# Patient Record
Sex: FEMALE | Race: WHITE | Employment: FULL TIME | URBAN - METROPOLITAN AREA
[De-identification: names, ages, dates, MRNs, and addresses within clinical notes are randomized per-mention and may not be internally consistent; named-entity substitution may affect disease eponyms.]

---

## 2024-01-19 ENCOUNTER — HOSPITAL ENCOUNTER (OUTPATIENT)
Facility: HOSPITAL | Age: 19
Discharge: HOME | End: 2024-01-20
Attending: SPECIALIST | Admitting: SPECIALIST

## 2024-01-19 ENCOUNTER — ANESTHESIA (OUTPATIENT)
Dept: SURGERY | Facility: HOSPITAL | Age: 19
End: 2024-01-19

## 2024-01-19 ENCOUNTER — ANESTHESIA EVENT (OUTPATIENT)
Dept: SURGERY | Facility: HOSPITAL | Age: 19
End: 2024-01-19

## 2024-01-19 DIAGNOSIS — N62 MACROMASTIA: Primary | ICD-10-CM

## 2024-01-19 LAB — B-HCG UR QL: NEGATIVE

## 2024-01-19 PROCEDURE — 0HBV0ZZ EXCISION OF BILATERAL BREAST, OPEN APPROACH: ICD-10-PCS | Performed by: SPECIALIST

## 2024-01-19 PROCEDURE — 88305 TISSUE EXAM BY PATHOLOGIST: CPT | Performed by: SPECIALIST

## 2024-01-19 PROCEDURE — 81025 URINE PREGNANCY TEST: CPT

## 2024-01-19 RX ORDER — ONDANSETRON 2 MG/ML
4 INJECTION INTRAMUSCULAR; INTRAVENOUS EVERY 6 HOURS PRN
Status: DISCONTINUED | OUTPATIENT
Start: 2024-01-19 | End: 2024-01-19 | Stop reason: HOSPADM

## 2024-01-19 RX ORDER — MORPHINE SULFATE 4 MG/ML
4 INJECTION, SOLUTION INTRAMUSCULAR; INTRAVENOUS EVERY 2 HOUR PRN
Status: DISCONTINUED | OUTPATIENT
Start: 2024-01-19 | End: 2024-01-20

## 2024-01-19 RX ORDER — CEFAZOLIN SODIUM/WATER 2 G/20 ML
2 SYRINGE (ML) INTRAVENOUS ONCE
Status: COMPLETED | OUTPATIENT
Start: 2024-01-19 | End: 2024-01-19

## 2024-01-19 RX ORDER — ONDANSETRON 2 MG/ML
4 INJECTION INTRAMUSCULAR; INTRAVENOUS EVERY 6 HOURS PRN
Status: DISCONTINUED | OUTPATIENT
Start: 2024-01-19 | End: 2024-01-20

## 2024-01-19 RX ORDER — MORPHINE SULFATE 4 MG/ML
2 INJECTION, SOLUTION INTRAMUSCULAR; INTRAVENOUS EVERY 10 MIN PRN
Status: DISCONTINUED | OUTPATIENT
Start: 2024-01-19 | End: 2024-01-19 | Stop reason: HOSPADM

## 2024-01-19 RX ORDER — MORPHINE SULFATE 4 MG/ML
4 INJECTION, SOLUTION INTRAMUSCULAR; INTRAVENOUS EVERY 10 MIN PRN
Status: DISCONTINUED | OUTPATIENT
Start: 2024-01-19 | End: 2024-01-19 | Stop reason: HOSPADM

## 2024-01-19 RX ORDER — MORPHINE SULFATE 10 MG/ML
6 INJECTION, SOLUTION INTRAMUSCULAR; INTRAVENOUS EVERY 10 MIN PRN
Status: DISCONTINUED | OUTPATIENT
Start: 2024-01-19 | End: 2024-01-19 | Stop reason: HOSPADM

## 2024-01-19 RX ORDER — DEXAMETHASONE SODIUM PHOSPHATE 4 MG/ML
VIAL (ML) INJECTION AS NEEDED
Status: DISCONTINUED | OUTPATIENT
Start: 2024-01-19 | End: 2024-01-19 | Stop reason: SURG

## 2024-01-19 RX ORDER — ACETAMINOPHEN 500 MG
1000 TABLET ORAL ONCE
Status: COMPLETED | OUTPATIENT
Start: 2024-01-19 | End: 2024-01-19

## 2024-01-19 RX ORDER — HYDROCODONE BITARTRATE AND ACETAMINOPHEN 5; 325 MG/1; MG/1
1 TABLET ORAL EVERY 6 HOURS PRN
Qty: 20 TABLET | Refills: 0 | Status: SHIPPED | OUTPATIENT
Start: 2024-01-19

## 2024-01-19 RX ORDER — HYDROMORPHONE HYDROCHLORIDE 1 MG/ML
0.4 INJECTION, SOLUTION INTRAMUSCULAR; INTRAVENOUS; SUBCUTANEOUS EVERY 5 MIN PRN
Status: DISCONTINUED | OUTPATIENT
Start: 2024-01-19 | End: 2024-01-19 | Stop reason: HOSPADM

## 2024-01-19 RX ORDER — NEOSTIGMINE METHYLSULFATE 1 MG/ML
INJECTION, SOLUTION INTRAVENOUS AS NEEDED
Status: DISCONTINUED | OUTPATIENT
Start: 2024-01-19 | End: 2024-01-19 | Stop reason: SURG

## 2024-01-19 RX ORDER — MORPHINE SULFATE 2 MG/ML
1 INJECTION, SOLUTION INTRAMUSCULAR; INTRAVENOUS EVERY 2 HOUR PRN
Status: DISCONTINUED | OUTPATIENT
Start: 2024-01-19 | End: 2024-01-20

## 2024-01-19 RX ORDER — ACETAMINOPHEN 325 MG/1
650 TABLET ORAL EVERY 4 HOURS PRN
Status: DISCONTINUED | OUTPATIENT
Start: 2024-01-19 | End: 2024-01-20

## 2024-01-19 RX ORDER — ROCURONIUM BROMIDE 10 MG/ML
INJECTION, SOLUTION INTRAVENOUS AS NEEDED
Status: DISCONTINUED | OUTPATIENT
Start: 2024-01-19 | End: 2024-01-19 | Stop reason: SURG

## 2024-01-19 RX ORDER — SODIUM CHLORIDE, SODIUM LACTATE, POTASSIUM CHLORIDE, CALCIUM CHLORIDE 600; 310; 30; 20 MG/100ML; MG/100ML; MG/100ML; MG/100ML
INJECTION, SOLUTION INTRAVENOUS CONTINUOUS
Status: DISCONTINUED | OUTPATIENT
Start: 2024-01-19 | End: 2024-01-19

## 2024-01-19 RX ORDER — DEXTROSE, SODIUM CHLORIDE, SODIUM LACTATE, POTASSIUM CHLORIDE, AND CALCIUM CHLORIDE 5; .6; .31; .03; .02 G/100ML; G/100ML; G/100ML; G/100ML; G/100ML
INJECTION, SOLUTION INTRAVENOUS CONTINUOUS
Status: DISCONTINUED | OUTPATIENT
Start: 2024-01-19 | End: 2024-01-20

## 2024-01-19 RX ORDER — MORPHINE SULFATE 2 MG/ML
2 INJECTION, SOLUTION INTRAMUSCULAR; INTRAVENOUS EVERY 2 HOUR PRN
Status: DISCONTINUED | OUTPATIENT
Start: 2024-01-19 | End: 2024-01-20

## 2024-01-19 RX ORDER — HYDROCODONE BITARTRATE AND ACETAMINOPHEN 5; 325 MG/1; MG/1
2 TABLET ORAL EVERY 4 HOURS PRN
Status: DISCONTINUED | OUTPATIENT
Start: 2024-01-19 | End: 2024-01-20

## 2024-01-19 RX ORDER — SODIUM CHLORIDE, SODIUM LACTATE, POTASSIUM CHLORIDE, CALCIUM CHLORIDE 600; 310; 30; 20 MG/100ML; MG/100ML; MG/100ML; MG/100ML
INJECTION, SOLUTION INTRAVENOUS CONTINUOUS
Status: DISCONTINUED | OUTPATIENT
Start: 2024-01-19 | End: 2024-01-19 | Stop reason: HOSPADM

## 2024-01-19 RX ORDER — GLYCOPYRROLATE 0.2 MG/ML
INJECTION, SOLUTION INTRAMUSCULAR; INTRAVENOUS AS NEEDED
Status: DISCONTINUED | OUTPATIENT
Start: 2024-01-19 | End: 2024-01-19 | Stop reason: SURG

## 2024-01-19 RX ORDER — TEMAZEPAM 15 MG/1
15 CAPSULE ORAL NIGHTLY PRN
Status: DISCONTINUED | OUTPATIENT
Start: 2024-01-19 | End: 2024-01-20

## 2024-01-19 RX ORDER — FAMOTIDINE 10 MG/ML
20 INJECTION, SOLUTION INTRAVENOUS ONCE
Status: COMPLETED | OUTPATIENT
Start: 2024-01-19 | End: 2024-01-19

## 2024-01-19 RX ORDER — METOCLOPRAMIDE HYDROCHLORIDE 5 MG/ML
10 INJECTION INTRAMUSCULAR; INTRAVENOUS ONCE
Status: COMPLETED | OUTPATIENT
Start: 2024-01-19 | End: 2024-01-19

## 2024-01-19 RX ORDER — HYDROMORPHONE HYDROCHLORIDE 1 MG/ML
INJECTION, SOLUTION INTRAMUSCULAR; INTRAVENOUS; SUBCUTANEOUS AS NEEDED
Status: DISCONTINUED | OUTPATIENT
Start: 2024-01-19 | End: 2024-01-19 | Stop reason: SURG

## 2024-01-19 RX ORDER — HYDROCODONE BITARTRATE AND ACETAMINOPHEN 5; 325 MG/1; MG/1
1 TABLET ORAL EVERY 4 HOURS PRN
Status: DISCONTINUED | OUTPATIENT
Start: 2024-01-19 | End: 2024-01-20

## 2024-01-19 RX ORDER — FAMOTIDINE 20 MG/1
20 TABLET, FILM COATED ORAL ONCE
Status: COMPLETED | OUTPATIENT
Start: 2024-01-19 | End: 2024-01-19

## 2024-01-19 RX ORDER — HYDROMORPHONE HYDROCHLORIDE 1 MG/ML
0.2 INJECTION, SOLUTION INTRAMUSCULAR; INTRAVENOUS; SUBCUTANEOUS EVERY 5 MIN PRN
Status: DISCONTINUED | OUTPATIENT
Start: 2024-01-19 | End: 2024-01-19 | Stop reason: HOSPADM

## 2024-01-19 RX ORDER — ONDANSETRON 2 MG/ML
INJECTION INTRAMUSCULAR; INTRAVENOUS AS NEEDED
Status: DISCONTINUED | OUTPATIENT
Start: 2024-01-19 | End: 2024-01-19 | Stop reason: SURG

## 2024-01-19 RX ORDER — NALOXONE HYDROCHLORIDE 0.4 MG/ML
0.08 INJECTION, SOLUTION INTRAMUSCULAR; INTRAVENOUS; SUBCUTANEOUS AS NEEDED
Status: DISCONTINUED | OUTPATIENT
Start: 2024-01-19 | End: 2024-01-19 | Stop reason: HOSPADM

## 2024-01-19 RX ORDER — METOCLOPRAMIDE HYDROCHLORIDE 5 MG/ML
INJECTION INTRAMUSCULAR; INTRAVENOUS AS NEEDED
Status: DISCONTINUED | OUTPATIENT
Start: 2024-01-19 | End: 2024-01-19 | Stop reason: SURG

## 2024-01-19 RX ORDER — HYDROMORPHONE HYDROCHLORIDE 1 MG/ML
0.6 INJECTION, SOLUTION INTRAMUSCULAR; INTRAVENOUS; SUBCUTANEOUS EVERY 5 MIN PRN
Status: DISCONTINUED | OUTPATIENT
Start: 2024-01-19 | End: 2024-01-19 | Stop reason: HOSPADM

## 2024-01-19 RX ORDER — CEPHALEXIN 500 MG/1
500 CAPSULE ORAL 4 TIMES DAILY
Qty: 28 CAPSULE | Refills: 0 | Status: SHIPPED | OUTPATIENT
Start: 2024-01-19

## 2024-01-19 RX ORDER — PROCHLORPERAZINE EDISYLATE 5 MG/ML
5 INJECTION INTRAMUSCULAR; INTRAVENOUS EVERY 8 HOURS PRN
Status: DISCONTINUED | OUTPATIENT
Start: 2024-01-19 | End: 2024-01-20

## 2024-01-19 RX ORDER — CEFAZOLIN SODIUM/WATER 2 G/20 ML
2 SYRINGE (ML) INTRAVENOUS EVERY 8 HOURS
Qty: 40 ML | Refills: 0 | Status: COMPLETED | OUTPATIENT
Start: 2024-01-19 | End: 2024-01-20

## 2024-01-19 RX ORDER — BUPIVACAINE HYDROCHLORIDE 2.5 MG/ML
INJECTION, SOLUTION EPIDURAL; INFILTRATION; INTRACAUDAL AS NEEDED
Status: DISCONTINUED | OUTPATIENT
Start: 2024-01-19 | End: 2024-01-19 | Stop reason: HOSPADM

## 2024-01-19 RX ORDER — MIDAZOLAM HYDROCHLORIDE 1 MG/ML
INJECTION INTRAMUSCULAR; INTRAVENOUS AS NEEDED
Status: DISCONTINUED | OUTPATIENT
Start: 2024-01-19 | End: 2024-01-19 | Stop reason: SURG

## 2024-01-19 RX ORDER — METOCLOPRAMIDE 10 MG/1
10 TABLET ORAL ONCE
Status: COMPLETED | OUTPATIENT
Start: 2024-01-19 | End: 2024-01-19

## 2024-01-19 RX ADMIN — ROCURONIUM BROMIDE 50 MG: 10 INJECTION, SOLUTION INTRAVENOUS at 12:05:00

## 2024-01-19 RX ADMIN — CEFAZOLIN SODIUM/WATER 2 G: 2 G/20 ML SYRINGE (ML) INTRAVENOUS at 11:57:00

## 2024-01-19 RX ADMIN — NEOSTIGMINE METHYLSULFATE 3 MG: 1 INJECTION, SOLUTION INTRAVENOUS at 14:26:00

## 2024-01-19 RX ADMIN — DEXAMETHASONE SODIUM PHOSPHATE 4 MG: 4 MG/ML VIAL (ML) INJECTION at 12:08:00

## 2024-01-19 RX ADMIN — MIDAZOLAM HYDROCHLORIDE 2 MG: 1 INJECTION INTRAMUSCULAR; INTRAVENOUS at 12:02:00

## 2024-01-19 RX ADMIN — HYDROMORPHONE HYDROCHLORIDE 1 MG: 1 INJECTION, SOLUTION INTRAMUSCULAR; INTRAVENOUS; SUBCUTANEOUS at 12:59:00

## 2024-01-19 RX ADMIN — GLYCOPYRROLATE 0.4 MG: 0.2 INJECTION, SOLUTION INTRAMUSCULAR; INTRAVENOUS at 14:26:00

## 2024-01-19 RX ADMIN — ONDANSETRON 4 MG: 2 INJECTION INTRAMUSCULAR; INTRAVENOUS at 12:08:00

## 2024-01-19 RX ADMIN — METOCLOPRAMIDE HYDROCHLORIDE 10 MG: 5 INJECTION INTRAMUSCULAR; INTRAVENOUS at 12:08:00

## 2024-01-19 NOTE — DISCHARGE SUMMARY
[facility]    Discharge Summary    Bonnie Martinez Patient Status:  Outpatient in a Bed    2005 MRN [id number]   Location [facility] OPERATING ROOM Attending Libertad Mcclelland MD   Hosp Day # 0 PCP Ricardo Norman MD     Date of Admission: 2024   Date of Discharge:2024    Admitting Diagnosis: Macromastia, hypertrophy of breasts  Macromastia    Disposition: Discharge to home    Discharge Diagnosis: Macromastia, hypertrophy of breasts  Macromastia      Hospital Course:   Reason for Admission: Macromastia, hypertrophy of breasts  Macromastia      Surgical Procedures       Case IDs Date Procedure Surgeon Location Status    [id number] Bilateral breast reduction with MELODY drain and pain pump placement Libertad Mcclelland MD [facility] MAIN OR [facility]            Hospital Course: Uncomplicated    Complications: None    Pending Labs       Order Current Status    Specimen to Pathology Tissue Collected ([id number])            Discharge Plan:   Discharge Condition:Good    Current Discharge Medication List        New Orders    Details   HYDROcodone-acetaminophen (NORCO) 5-325 MG Oral Tab Take 1 tablet by mouth every 6 (six) hours as needed for Pain.      cephalexin 500 MG Oral Cap Take 1 capsule (500 mg total) by mouth 4 (four) times daily.           Home Meds - Unchanged    Details   Ascorbic Acid (VITAMIN C ER OR) Take by mouth.      Multiple Vitamin (MULTIVITAMIN ADULT OR) Take by mouth.                   Discharge Diet: Low sodium diet (1500mg/day)    Discharge Activity: As tolerated    Follow up:      Follow-up Information       Libertad Mcclelland MD Follow up in 2 day(s).    Specialty: SURGERY, PLASTIC  Why: MELODY drain and pain pump removal  Contact information:  [address]  [address] [address] [phone]                                 Other Discharge Instructions:         MELODY drain care as instructed - empty, record and strip MELODY drains as needed to keep a kink in  the MELODY bulb.  Please reinforce MELODY drain sites with 4x4 gauze as needed if there is drainage from under the clear plastic adhesive dressing.  Do not remove any plastic adhesive dressings covering MELODY drain skin sites or pain pump sites.  May remove garment briefly if needed, then replace promptly.  Begin antibiotics tonight as instructed; Pain medication as needed for pain.  Norco or Tylenol as needed for pain; May start OTC NSAID (Advil or Ibuprofen) 48 hours after surgery for pain;  Ambulate and deep breathe regularly.  Hold on shower until pain pumps and MELODY drains removed. May sponge bathe as needed.  Return to clinic as scheduled.         TAMMY YANEZ MD  1/19/2024

## 2024-01-19 NOTE — H&P
History & Physical Examination    Patient Name: Bonnie Martinez  MRN: [id number]  CSN: [id number]  YOB: 2005    Diagnosis: Macromastia    Present Illness: 17 yo presents with complaints of macromastia and requests a bilateral breast reduction with Dominick drain and pain pump placement. All risks and benefits and alternatives to surgery discussed and questions answered in detail and patient consents to proceed.     Medications Prior to Admission   Medication Sig Dispense Refill Last Dose    Ascorbic Acid (VITAMIN C ER OR) Take by mouth.       Multiple Vitamin (MULTIVITAMIN ADULT OR) Take by mouth.        Current Facility-Administered Medications   Medication Dose Route Frequency    lactated ringers infusion   Intravenous Continuous    acetaminophen (Tylenol Extra Strength) tab 1,000 mg  1,000 mg Oral Once    famotidine (Pepcid) tab 20 mg  20 mg Oral Once    Or    famotidine (Pepcid) 20 mg/2mL injection 20 mg  20 mg Intravenous Once    metoclopramide (Reglan) tab 10 mg  10 mg Oral Once    Or    metoclopramide (Reglan) 5 mg/mL injection 10 mg  10 mg Intravenous Once    ceFAZolin (Ancef) 2 g in 20mL IV syringe premix  2 g Intravenous Once       Allergies: No Known Allergies    Past Medical History:   Diagnosis Date    Visual impairment     glasses     History reviewed. No pertinent surgical history.  Family History   Problem Relation Age of Onset    Hypertension Maternal Grandmother     Hypertension Maternal Grandfather      Social History     Tobacco Use    Smoking status: Never    Smokeless tobacco: Never   Substance Use Topics    Alcohol use: Never       SYSTEM Check if Review is Normal Check if Physical Exam is Abnormal If not normal, please explain:   HEENT [ x] [ ]    NECK & BACK [ x] [ ]    HEART [ x] [ ]    LUNGS [ x] [ ]    ABDOMEN [ x] [ ]    UROGENITAL [ x] [ ]    EXTREMITIES [ x] [ ]    BREASTS [ ] [ x] Macromastia   OTHER        [ x ] I have discussed the risks and benefits and alternatives  with the patient/family.  [ x ] The above referenced H&P was reviewed by Tammy Yanez MD on 1/19/2024 the patient was examined and no significant changes have occurred in the patient's condition since the H&P was performed.  I discussed with the patient and/or legal representative the potential benefits, risks and side effects of this procedure; the likelihood of the patient achieving goals; and potential problems that might occur during recuperation.  I discussed reasonable alternatives to the procedure, including risks, benefits and side effects related to the alternatives and risks related to not receiving this procedure.  We will proceed with procedure as planned. All questions have been answered in detail and they understand and agree to proceed with plan of care.  [ x ] I have reviewed the History and Physical done within the last 30 days.  Any changes noted above.    TAMMY YANEZ MD  1/19/2024  11:18 AM

## 2024-01-19 NOTE — BRIEF OP NOTE
Pre-Operative Diagnosis: Macromastia, hypertrophy of breasts     Post-Operative Diagnosis: Macromastia, hypertrophy of breasts      Procedure Performed:   Bilateral breast reduction with MELODY drain and pain pump placement    Surgeon(s) and Role:     * Tammy Yanez MD - Primary    Assistant(s):  Surgical Assistant.: Estelle Mcknight     Surgical Findings: Nothing unusual     Specimen: R and L breast tissue     Estimated Blood Loss: 50cc    Dictation Number:  Dictated    TAMMY YANEZ MD  1/19/2024  2:29 PM

## 2024-01-19 NOTE — DISCHARGE SUMMARY
[facility]    Discharge Summary    Bonnie Martinez Patient Status:  Outpatient in a Bed    2005 MRN [id number]   Location [facility] PRE OP RECOVERY Attending Libertad Mcclelland MD   Hosp Day # 0 PCP Ricardo Norman MD     Date of Admission: 2024   Date of Discharge: 2024    Admitting Diagnosis: Macromastia, hypertrophy of breasts    Disposition: Discharge to home    Discharge Diagnosis: Macromastia      Hospital Course:   Reason for Admission: Macromastia, hypertrophy of breasts      Surgical Procedures       Case IDs Date Procedure Surgeon Location Status    [id number] Bilateral breast reduction with MELODY drain and pain pump placement Libertad Mcclelland MD [facility] MAIN OR [facility]            Hospital Course: Uncomplicated    Complications: None        Discharge Plan:   Discharge Condition:Good    Current Discharge Medication List        Home Meds - Unchanged    Details   Ascorbic Acid (VITAMIN C ER OR) Take by mouth.      Multiple Vitamin (MULTIVITAMIN ADULT OR) Take by mouth.                   Discharge Diet: Low sodium diet (1500mg/day)    Discharge Activity: As tolerated    Follow up:      Follow-up Information       Libertad Mcclelland MD Follow up in 2 day(s).    Specialty: SURGERY, PLASTIC  Why: MELODY drain and pain pump removal  Contact information:  [address] [address] [phone]                                 Other Discharge Instructions:         MELODY drain care as instructed - empty, record and strip MELODY drains as needed to keep a kink in the MELODY bulb.  Please reinforce MELODY drain sites with 4x4 gauze as needed if there is drainage from under the clear plastic adhesive dressing.  Do not remove any plastic adhesive dressings covering MELODY drain skin sites or pain pump sites.  May remove garment briefly if needed, then replace promptly.  Begin antibiotics tonight as instructed; Pain medication as needed for pain.  Norco or Tylenol as needed for  pain; May start OTC NSAID (Advil or Ibuprofen) 48 hours after surgery for pain;  Ambulate and deep breathe regularly.  Hold on shower until pain pumps and MELODY drains removed. May sponge bathe as needed.  Return to clinic as scheduled.         TAMMY YANEZ MD  1/19/2024

## 2024-01-19 NOTE — DISCHARGE INSTRUCTIONS
MELODY drain care as instructed - empty, record and strip MELODY drains as needed to keep a kink in the MELODY bulb.  Please reinforce MELODY drain sites with 4x4 gauze as needed if there is drainage from under the clear plastic adhesive dressing.  Do not remove any plastic adhesive dressings covering MELODY drain skin sites or pain pump sites.  May remove garment briefly if needed, then replace promptly.  Begin antibiotics tonight as instructed; Pain medication as needed for pain.  Norco or Tylenol as needed for pain; May start OTC NSAID (Advil or Ibuprofen) 48 hours after surgery for pain;  Ambulate and deep breathe regularly.  Hold on shower until pain pumps and MELODY drains removed. May sponge bathe as needed.  Return to clinic as scheduled.

## 2024-01-19 NOTE — ANESTHESIA PROCEDURE NOTES
Airway  Date/Time: 1/19/2024 12:06 PM  Urgency: elective      General Information and Staff    Patient location during procedure: OR  Anesthesiologist: Efrain Clancy MD  Performed: anesthesiologist   Performed by: Efrain Clancy MD  Authorized by: Efrain Clancy MD      Indications and Patient Condition  Indications for airway management: anesthesia  Sedation level: deep  Preoxygenated: yes  Patient position: sniffing  Mask difficulty assessment: 1 - vent by mask    Final Airway Details  Final airway type: endotracheal airway      Successful airway: ETT  Cuffed: yes   Successful intubation technique: direct laryngoscopy  Endotracheal tube insertion site: oral  Blade size: #3  ETT size (mm): 6.5    Cormack-Lehane Classification: grade I - full view of glottis  Placement verified by: capnometry   Cuff volume (mL): 5  Measured from: lips  ETT to lips (cm): 21  Number of attempts at approach: 1

## 2024-01-19 NOTE — ANESTHESIA POSTPROCEDURE EVALUATION
Patient: Bonnie Martinez    Procedure Summary       Date: 01/19/24 Room / Location: [facility] MAIN OR 08 / [facility] MAIN OR    Anesthesia Start: 1157 Anesthesia Stop: 1449    Procedure: Bilateral breast reduction with MELODY drain and pain pump placement (Bilateral: Breast) Diagnosis: (Macromastia, hypertrophy of breasts)    Surgeons: Libertad Mcclelland MD Anesthesiologist: Efrain Clancy MD    Anesthesia Type: general ASA Status: 2            Anesthesia Type: general    Vitals Value Taken Time   /75 01/19/24 1447   Temp 98F 01/19/24 1449   Pulse 100 01/19/24 1449   Resp 0 01/19/24 1449   SpO2 95 % 01/19/24 1449   Vitals shown include unfiled device data.    [facility] AN Post Evaluation:   Patient Evaluated in PACU  Patient Participation: complete - patient participated  Level of Consciousness: sleepy but conscious and responsive to verbal stimuli  Pain Score: 0  Pain Management: adequate  Airway Patency:patent  Dental exam unchanged from preop  Yes    Cardiovascular Status: acceptable  Respiratory Status: acceptable  Postoperative Hydration acceptable      Efrain Clancy MD  1/19/2024 2:49 PM

## 2024-01-19 NOTE — ANESTHESIA PREPROCEDURE EVALUATION
Anesthesia PreOp Note    HPI:     Bonnie Martinez is a 18 year old female who presents for preoperative consultation requested by: Libertad Mcclelland MD    Date of Surgery: 1/19/2024    Procedure(s):  Bilateral breast reduction with MELODY drain and pain pump placement  Indication: Macromastia, hypertrophy of breasts    Relevant Problems   No relevant active problems       NPO:                         History Review:  There are no problems to display for this patient.      Past Medical History:   Diagnosis Date   • Visual impairment     glasses       History reviewed. No pertinent surgical history.    Medications Prior to Admission   Medication Sig Dispense Refill Last Dose   • Ascorbic Acid (VITAMIN C ER OR) Take by mouth.      • Multiple Vitamin (MULTIVITAMIN ADULT OR) Take by mouth.        Current Facility-Administered Medications Ordered in Epic   Medication Dose Route Frequency Provider Last Rate Last Admin   • lactated ringers infusion   Intravenous Continuous Libertad Mcclelland MD       • acetaminophen (Tylenol Extra Strength) tab 1,000 mg  1,000 mg Oral Once Libertad Mcclelland MD       • famotidine (Pepcid) tab 20 mg  20 mg Oral Once Libertad cMclelland MD        Or   • famotidine (Pepcid) 20 mg/2mL injection 20 mg  20 mg Intravenous Once Libertad Mcclelland MD       • metoclopramide (Reglan) tab 10 mg  10 mg Oral Once Libertad Mcclelland MD        Or   • metoclopramide (Reglan) 5 mg/mL injection 10 mg  10 mg Intravenous Once Libertad Mcclelland MD       • ceFAZolin (Ancef) 2 g in 20mL IV syringe premix  2 g Intravenous Once Libertad Mcclelland MD         No current [facility]-ordered outpatient medications on file.       No Known Allergies    Family History   Problem Relation Age of Onset   • Hypertension Maternal Grandmother    • Hypertension Maternal Grandfather      Social History     Socioeconomic History   • Marital status: Single   Tobacco Use   • Smoking status: Never   • Smokeless tobacco:  Never   Vaping Use   • Vaping Use: Never used   Substance and Sexual Activity   • Alcohol use: Never   • Drug use: Never       Available pre-op labs reviewed.             Vital Signs:  Body mass index is 31.89 kg/m².   height is 1.6 m (5' 3\") and weight is 81.6 kg (180 lb).   Vitals:    01/04/24 1209   Weight: 81.6 kg (180 lb)   Height: 1.6 m (5' 3\")        Anesthesia Evaluation     Patient summary reviewed and Nursing notes reviewed    Airway   Mallampati: II  TM distance: >3 FB  Neck ROM: full  Dental      Pulmonary - negative ROS and normal exam   Cardiovascular - negative ROS and normal exam  Exercise tolerance: good    Neuro/Psych - negative ROS     GI/Hepatic/Renal - negative ROS     Endo/Other - negative ROS   Abdominal  - normal exam               Anesthesia Plan:   ASA:  2  Plan:   General  Airway:  ETT  Informed Consent Plan and Risks Discussed With:  Patient      I have informed Bonnie Martinez and/or legal guardian or family member of the nature of the anesthetic plan, benefits, risks including possible dental damage if relevant, major complications, and any alternative forms of anesthetic management.   All of the patient's questions were answered to the best of my ability. The patient desires the anesthetic management as planned.  Efrain Clancy MD  1/19/2024 11:20 AM  Present on Admission:  **None**

## 2024-01-20 VITALS
SYSTOLIC BLOOD PRESSURE: 108 MMHG | RESPIRATION RATE: 16 BRPM | DIASTOLIC BLOOD PRESSURE: 73 MMHG | OXYGEN SATURATION: 100 % | TEMPERATURE: 98 F | BODY MASS INDEX: 31.36 KG/M2 | HEIGHT: 63 IN | HEART RATE: 92 BPM | WEIGHT: 177 LBS

## 2024-01-20 NOTE — OPERATIVE REPORT
[facility]    PATIENT'S NAME: ADELINE COREA   ATTENDING PHYSICIAN: Libertad Mcclelland MD   OPERATING PHYSICIAN: Libertad Mcclelland MD   PATIENT ACCOUNT#:   [id number]    LOCATION:  [facility] PACU [address] [place] [address]  MEDICAL RECORD #:   [id number]       YOB: 2005  ADMISSION DATE:       01/19/2024      OPERATION DATE:  01/19/2024    OPERATIVE REPORT      PREOPERATIVE DIAGNOSIS:  Bilateral symptomatic macromastia.  POSTOPERATIVE DIAGNOSIS:  Bilateral symptomatic macromastia.  PROCEDURE:  Bilateral breast reduction with MELODY drain and pain pump placement.    ASSISTANT:  Estelle Mcknight CSA     ANESTHESIA:  General endotracheal anesthesia.    ESTIMATED BLOOD LOSS:  50 mL.    INTRAVENOUS FLUIDS:  2000 mL of crystalloid.    COMPLICATIONS:  None.    DRAINS PLACED:  One 7 mm flat MELODY drain in each lateral breast pocket and 1 On-Q pain catheter in each breast pocket for postop pain control.      TISSUE REMOVED:  Right breast 712 g of breast tissue, left breast 520 g of breast tissue.    DISPOSITION:  Patient tolerated the procedure well, was awakened from anesthesia, and transferred to the recovery room in good condition.    INDICATIONS:  This is a 18-year-old patient who presented to my office with complaints of bilateral symptomatic macromastia.  She requested a bilateral breast reduction procedure.  We discussed the operation in detail at 2 separate preoperative visits.  She was made aware of the risks of bleeding; infection; scarring; asymmetry; contour irregularities; wound dehiscence with the need for secondary wound healing; skin flap necrosis with the need for secondary wound healing; nipple necrosis with the need for secondary wound healing; skin flap sensory loss that can be permanent in nature; nipple-areolar complex sensory loss that can be permanent in nature; no guarantee on final breast cup size; the use of MELODY drains and pain pumps with the operation; the need for revisional surgery;  overall dissatisfaction with final aesthetic outcome; and the rare but possible risk of DVT, PE, MI, COVID-19 infection, sepsis and death.  Understanding these risks and limitations, she wished to proceed.  I answered all her questions in detail at 2 separate preoperative visits and again in the preop holding area.  Once all questions were answered and consents obtained, the procedure went as follows.    OPERATIVE TECHNIQUE:  Patient was taken to the operating room and placed on the table in supine position.  General endotracheal anesthesia was induced without any complications.  The anterior chest wall area was prepped and draped in normal sterile fashion.  At the beginning of the procedure, approximately 250 mL of tumescent solution was infiltrated around the periphery of each breast pedicle.  At this point, the 45 mm cookie cutter was used to imprint the nipple-areolar complex on each side, and the skin was de-epithelialized between the edge of the new nipple-areolar complex and the edge of the pedicle.  At this point, the incision line around the periphery of the pedicle was made with a 10 blade and taken for a depth of approximately 1.5 cm.  The skin flaps were elevated off the underlying breast gland keeping them 1.5 cm first medially, then superiorly, and then laterally.  At this point, the breast glands were debulked to an appropriate size for the patient's body habitus and as per her request to be somewhere in the range of 36 C or D cup size.  The skin flaps were brought down over the reduced glands and secured at a point 11 cm off the midline along the inframammary fold.  The medial and lateral dog ears were marked, incised sharply, and removed.  The skin flaps were reopened.  Bipolar was used for hemostasis throughout.  The areas were irrigated multiple times with warm antibiotic irrigation as well as with Betadine, again examined for bleeding, and bipolar was again used for hemostasis throughout.  The  pedicles were tacked to the anterior chest wall with 2 interrupted sutures of 2-0 PDS, 1 medial and lateral.  Then, stab incisions were made laterally for placement of 7 mm flat MELODY drains, and the On-Q pain pump catheters were placed in the breast pocket as well.  At this point, the skin flaps were brought down and sewn in place with 2-0 Vicryl and 4-0 Prolene.  The patient was then seated in upright sitting position and found to have excellent breast shape and symmetry.  The new positions for the nipple-areolar complexes were marked with the 38 mm cookie cutters and measured for symmetry from the sternal notch and midline.  Once symmetry was ensured, the markings were incised.  The skin and underlying subcutaneous tissue was removed sharply.  Bipolar was used for hemostasis along the fresh incisional edge, and then the original nipple-areolar complexes, which were pink and viable with no evidence of venous congestion or ischemia, were brought out and sewn in place with 3-0 Vicryl and 4-0 Prolene.  Mastisol and Steri-Strips were applied along the incision line.  Mastisol, Steri-Strips, Biopatches, and Tegaderms were used to secure the MELODY drains and pain pumps.  The pain pumps were bolused with 10 mL of 0.5% Marcaine on each side, and the drains were placed on bulb suction.  The patient was placed into a support bra, awakened from anesthesia, and transferred to the recovery room in good condition.  She will be admitted for an overnight 23-hour observation.    Dictated By Libertad Mcclelland MD  d: 01/19/2024 14:22:39  t: 01/19/2024 18:20:34  [facility] [id number]  [facility]/

## 2024-01-20 NOTE — PROGRESS NOTES
[facility]    Progress Note    Bonnie Martinez Patient Status:  Outpatient in a Bed    2005 MRN [id number]   Location [facility] Attending Libertad Yanez MD   Hosp Day # 0 PCP Ricardo Norman MD     Subjective:  Feeling well this AM. Nahid po and ambulating. Pain controlled with Tylenol.     Objective/Physical Exam:  General: Alert, orientated x3.  Cooperative.  No apparent distress.  Vital Signs:  Blood pressure 108/73, pulse 92, temperature 98.1 °F (36.7 °C), temperature source Oral, resp. rate 16, height 5' 3\" (1.6 m), weight 177 lb (80.3 kg), last menstrual period 2024, SpO2 100%.  HEENT: Exam is unremarkable.  Without scleral icterus.  Mucous membranes are moist. Pupils are equal and round.    Neck: Full range of motion to flexion and extension, lateral rotation and lateral flexion of cervical spine.   Breasts:  Symmetric, well shaped. Skin flaps pink and viable. NACs pink and viable. Incision lines c/d/i. No hematoma.  Abdomen:  Soft, non-distended, non-tender, with no rebound or guarding.  Incision lines c/d/i.   Cardiac: Regular rate and rhythm.   Extremities:  No lower extremity edema noted.    Skin: Normal texture and turgor.  Neurologic: Cranial nerves are grossly intact.  Motor strength and sensory examination is grossly normal.  No focal neurologic deficit.    Assessment/Plan:  Patient Active Problem List   Diagnosis   (none) - all problems resolved or deleted     POD 1 doing well. Plan for DC home today. RTC Monday for pain pump and Dominick drain removal. DW patient and family ambulation and deep breathing. Keflex and Norco or Tylenol as prescribed at home.     Time spent on counseling/coordination of care:  15 minutes  Total time spent with patient:  15 minutes    LIBERTAD YANEZ MD  2024  10:37 AM

## (undated) DEVICE — KIT INFUS PMP 270ML 4ML/HR 2ML/SITE SOAK CATH .

## (undated) DEVICE — SUTURE PROL MONO BLU 4/0 18PS2

## (undated) DEVICE — PROXIMATE SKIN STAPLERS (35 WIDE) CONTAINS 35 STAINLESS STEEL STAPLES (FIXED HEAD): Brand: PROXIMATE

## (undated) DEVICE — OR TOWEL, 17" X 26" STERILE, BLUE: Brand: PREMIERPRO

## (undated) DEVICE — 3M™ STERI-DRAPE™ INSTRUMENT POUCH 1018: Brand: STERI-DRAPE™

## (undated) DEVICE — ENCORE® LATEX MICRO SIZE 6.5, STERILE LATEX POWDER-FREE SURGICAL GLOVE: Brand: ENCORE

## (undated) DEVICE — SUT VCRL SZ 3-0 L18IN ABSRB UD PS-2 L19MM

## (undated) DEVICE — BANDAGE,GAUZE,BULKEE II,4.5"X4.1YD,STRL: Brand: MEDLINE

## (undated) DEVICE — 3M™ STERI-STRIP™ REINFORCED ADHESIVE SKIN CLOSURES, R1547, 1/2 IN X 4 IN (12 MM X 100 MM), 6 STRIPS/ENVELOPE: Brand: 3M™ STERI-STRIP™

## (undated) DEVICE — ADHESIVE LIQ 2/3ML VI MASTISOL

## (undated) DEVICE — Device: Brand: JELCO

## (undated) DEVICE — INTENDED FOR TISSUE SEPARATION, AND OTHER PROCEDURES THAT REQUIRE A SHARP SURGICAL BLADE TO PUNCTURE OR CUT.: Brand: BARD-PARKER ® STAINLESS STEEL BLADES

## (undated) DEVICE — CABLE BPLR L12FT FLYING LD DISP

## (undated) DEVICE — SKIN PREP TRAY 4 COMPARTM TRAY: Brand: MEDLINE INDUSTRIES, INC.

## (undated) DEVICE — SUTURE VCRL SZ 2-0 L27IN ABSRB UD L24MM FS-1

## (undated) DEVICE — GAMMEX® PI HYBRID SIZE 6.5, STERILE POWDER-FREE SURGICAL GLOVE, POLYISOPRENE AND NEOPRENE BLEND: Brand: GAMMEX

## (undated) DEVICE — BIOPATCH™ ANTIMICROBIAL DRESSING WITH CHLORHEXIDINE GLUCONATE IS A HYDROPHILLIC POLYURETHANE ABSORPTIVE FOAM WITH CHLORHEXIDINE GLUCONATE (CHG) WHICH INHIBITS BACTERIAL GROWTH UNDER THE DRESSING. THE DRESSING IS INTENDED TO BE USED TO ABSORB EXUDATE, COVER A WOUND CAUSED BY VASCULAR AND NONVASCULAR PERCUTANEOUS MEDICAL DEVICES DURING SURGERY, AS WELL AS REDUCE LOCAL INFECTION AND COLONIZATION OF MICROORGANISMS.: Brand: BIOPATCH

## (undated) DEVICE — INTENDED USE FOR SURGICAL MARKING ON INTACT SKIN, ALSO PROVIDES A PERMANENT METHOD OF IDENTIFYING OBJECTS IN THE OPERATING ROOM: Brand: WRITESITE® PLUS MINI PREP RESISTANT MARKER

## (undated) DEVICE — EVACUATOR SUR 100CC SIL BLB WND

## (undated) DEVICE — SOLUTION IRRIG 1000ML 0.9% NACL USP BTL

## (undated) DEVICE — SUTURE PDS II SZ 2-0 L27IN ABSRB VLT L26MM

## (undated) DEVICE — UNDYED BRAIDED (POLYGLACTIN 910), SYNTHETIC ABSORBABLE SUTURE: Brand: COATED VICRYL

## (undated) DEVICE — CG INFILTRATION TUBING: Brand: CG INFILTRATION TUBING

## (undated) DEVICE — APPLICATOR SWAB L6IN GEN PURP INDIVIDUALLY .

## (undated) DEVICE — MAJOR GENERAL: Brand: MEDLINE INDUSTRIES, INC.

## (undated) DEVICE — DRAIN SURG W7MMXL20CM SIL HUBLESS FLAT FLL

## (undated) DEVICE — SUCTION CANISTER, 3000CC,SAFELINER: Brand: DEROYAL

## (undated) DEVICE — 3M™ BAIR HUGGER® UNDERBODY BLANKET, FULL ACCESS, 10 PER CASE 63500: Brand: BAIR HUGGER™

## (undated) DEVICE — BLADE SS #10 STER